# Patient Record
Sex: FEMALE | Race: WHITE | URBAN - METROPOLITAN AREA
[De-identification: names, ages, dates, MRNs, and addresses within clinical notes are randomized per-mention and may not be internally consistent; named-entity substitution may affect disease eponyms.]

---

## 2018-01-18 ENCOUNTER — HISTORICAL (OUTPATIENT)
Dept: CARDIOLOGY | Facility: HOSPITAL | Age: 68
End: 2018-01-18

## 2022-04-10 ENCOUNTER — HISTORICAL (OUTPATIENT)
Dept: ADMINISTRATIVE | Facility: HOSPITAL | Age: 72
End: 2022-04-10

## 2022-04-25 VITALS
DIASTOLIC BLOOD PRESSURE: 63 MMHG | HEIGHT: 62 IN | SYSTOLIC BLOOD PRESSURE: 95 MMHG | BODY MASS INDEX: 22.68 KG/M2 | WEIGHT: 123.25 LBS

## 2022-04-30 NOTE — OP NOTE
DATE OF SURGERY:    01/18/2018    SURGEON:  Raghu Ovalle MD    DIAGNOSIS:  Coronary artery disease, significant angina, failing medical therapy, history of coronary artery disease status post coronary bypass, diabetes, peripheral arterial vascular disease, intermittent claudication on the right leg, history of revascularization of the left iliac and left common femoral artery in the past, diabetes.  Angiogram done in August 2017 in [place] at [facility] showed possible stenosis of the distal right coronary at the bifurcation of the posterior descending artery and posterior lateral branch.  The patient had been treated medically and failed this and she comes in today for revascularization.    PROCEDURE:  Selective coronary angiography of the vein graft, saphenous vein graft from the ascending aorta to the distal right coronary, angiogram of the right coronary dominant.  Please note the patient had collateral to the left circumflex that is chronically totally occluded from the right coronary.  We also performed an angiogram of the right lower extremity arterial angiogram due to intermittent claudication of the right leg and pain in the right leg.  Following angiogram of the coronary we performed balloon angioplasty and use a drug eluting stent successfully at the distal right coronary.    CONTRAST:  190 cc.   Fluoroscopy 11.3 minutes.   Access site was the right common femoral artery and 6-[place] sheath was used.    ESTIMATED BLOOD LOSS:  15 cc.    ANESTHESIA:  2% lidocaine for local anesthesia.  Conscious sedation with versed and fentanyl.  Heparin was used for anticoagulation.  ACT was documented to be therapeutic.  Nitroglycerin intracoronary was given during the procedure.     A 6-[place] sheath was used.  At the end of the procedure vascular device VASCADE was successfully used with no complications.  Guide catheter used was a JL4 6-[place].    FINDINGS:  HEMODYNAMICS:  Blood pressure  aorta was 100/80.  The patient was in a sinus rhythm with a heart rate of 60 beats per minute.  Angiogram was done only of the vein graft to the right coronary.  That is the culprit.  Please note that patient had a recent angiogram in August 2017 at [facility].  Angiogram of the vein graft showed excellent proximal distal anastomosis of the vein graft.  Vein graft is normal.  The native right coronary artery is 100% occluded proximally.  The right coronary artery is large and superdominant and supplies a very large posterior descending artery and posterior lateral branch as well as collaterals to the left circumflex.  The left circumflex is chronically totally occluded.  Also supplies collaterals to the left anterior descending.  Patient has a history of bypass in the past, left internal mammary artery to the left anterior descending failed, she only has a vein graft to the diagonal and obtuse marginal.  Recent angiogram in August was unremarkable the vein graft to the left coronary system.  Angiogram showed severe critical stenosis at the very distal right coronary just before the bifurcation into the posterior descending artery and large posterior lateral branch.  The lesion is at least 95 to 99% stenosed although the blood flow is WALDEMAR 3 normal.  Heparin was given and ACT was documented to be therapeutic.  We were able to cross the lesion with the angioplasty wire and perform balloon angioplasty of the distal right coronary with a 2.5 x 12 balloon at nominal atmospheres.  Significant residual left but we used a drug eluting stent, Xience Alpine 2.5 x 15 at 12 atmospheres and then ballooned it and postdilated it with a 3 x 12 regular balloon at 9 atmospheres just inside the stent.  Excellent result after nitroglycerin.  The stent was well expanded.  0% residual, WALDEMAR 3 blood flow.  The lesion completely resolved 0%.  Please note that we stented the distal right coronary into the large posterior lateral  branch.  This is the largest vessel.  We stented across the posterior descending artery that is small but the posterior descending artery stayed open with no significant compromise on the lumen of the posterior descending artery.  We tried to cross the posterior descending artery with a wire to rescue with a balloon angioplasty but it was unsuccessful.  There was significant tortuosity of this vessel.       Angiogram of the right lower extremity was done.  Iliacs normal.  Common femoral artery with mild luminal irregularities and 20% stenosis.  Performed superficial femoral artery popliteal three vessel runoff down to the right leg with no significant stenosis on the right lower extremity.    CONCLUSION:    1. Severe coronary artery disease of the right coronaries, large vessel dominant, supplied collateral to the circumflex and left anterior descending, successfully treated with drug eluting stent with no complication.  2. Angiogram right lower extremity is unremarkable and with the patient having pain in the right lower extremity most likely it is neuropathic.    PLAN:  Continue dual antiplatelet therapy, patient is on aspirin 81 mg p.o. q. day, Effient 10 mg p.o. q. day.  She is intolerant to Plavix.      ______________________________  MD MAYO Pedroza/ERNESTINE  DD:  01/18/2018  Time:  09:00AM  DT:  01/18/2018  Time:  12:30PM  Job #:  [id number]